# Patient Record
Sex: MALE | Race: OTHER | HISPANIC OR LATINO
[De-identification: names, ages, dates, MRNs, and addresses within clinical notes are randomized per-mention and may not be internally consistent; named-entity substitution may affect disease eponyms.]

---

## 2020-11-19 PROBLEM — Z00.00 ENCOUNTER FOR PREVENTIVE HEALTH EXAMINATION: Status: ACTIVE | Noted: 2020-11-19

## 2020-11-24 ENCOUNTER — APPOINTMENT (OUTPATIENT)
Dept: PULMONOLOGY | Facility: CLINIC | Age: 70
End: 2020-11-24
Payer: COMMERCIAL

## 2020-11-24 VITALS
WEIGHT: 195 LBS | DIASTOLIC BLOOD PRESSURE: 73 MMHG | SYSTOLIC BLOOD PRESSURE: 112 MMHG | HEART RATE: 59 BPM | OXYGEN SATURATION: 99 % | BODY MASS INDEX: 29.55 KG/M2 | HEIGHT: 68 IN | TEMPERATURE: 94.2 F

## 2020-11-24 DIAGNOSIS — Z87.09 PERSONAL HISTORY OF OTHER DISEASES OF THE RESPIRATORY SYSTEM: ICD-10-CM

## 2020-11-24 DIAGNOSIS — Z78.9 OTHER SPECIFIED HEALTH STATUS: ICD-10-CM

## 2020-11-24 DIAGNOSIS — Z87.891 PERSONAL HISTORY OF NICOTINE DEPENDENCE: ICD-10-CM

## 2020-11-24 PROCEDURE — 99204 OFFICE O/P NEW MOD 45 MIN: CPT

## 2020-11-24 RX ORDER — ASPIRIN 81 MG
81 TABLET, DELAYED RELEASE (ENTERIC COATED) ORAL
Refills: 0 | Status: ACTIVE | COMMUNITY

## 2020-11-24 NOTE — DISCUSSION/SUMMARY
[FreeTextEntry1] : 70 year old male, ex-smoker (quit smoking in 2019) with history of chronic allergic rhinitis with persistent cough (improved with use of Flonase nasal spray) came to clinic after CXR done in allergy specialist office showed emphysema. \par \par Oxygen saturation 99% on RA at rest\par \par A/P\par Patient has emphysema on CXR but clinically, patient denies history suggestive of symptoms secondary to COPD. Wheezing and chest congestion at night is due to post nasal drip and unlikely due to airway bronchospasm. Plan to get PFT with DLCO. We will request CXR. Follow up after PFT.

## 2020-11-24 NOTE — REVIEW OF SYSTEMS
[Fever] : no fever [Chills] : no chills [Dry Eyes] : no dry eyes [Eye Irritation] : no eye irritation [Nasal Congestion] : nasal congestion [Postnasal Drip] : postnasal drip [Cough] : cough [Hemoptysis] : no hemoptysis [Sputum] : no sputum [Dyspnea] : no dyspnea [SOB on Exertion] : no sob on exertion [Chest Discomfort] : no chest discomfort [Orthopnea] : no orthopnea [Hay Fever] : no hay fever [Watery Eyes] : no watery eyes [Nasal Discharge] : nasal discharge [Hives] : no hives [TextBox_148] : rest of ROS negative

## 2020-11-24 NOTE — PHYSICAL EXAM
[No Acute Distress] : no acute distress [Well Nourished] : well nourished [Normal Oropharynx] : normal oropharynx [II] : Mallampati Class: II [Normal Appearance] : normal appearance [No Neck Mass] : no neck mass [Normal Rate/Rhythm] : normal rate/rhythm [Normal S1, S2] : normal s1, s2 [No Resp Distress] : no resp distress [Clear to Auscultation Bilaterally] : clear to auscultation bilaterally [Benign] : benign [Not Tender] : not tender [Normal Gait] : normal gait [Deformity] : deformity [No Clubbing] : no clubbing [No Edema] : no edema [No Rash] : no rash [Normal Turgor] : normal turgor [No Focal Deficits] : no focal deficits [No Sensory Deficits] : no sensory deficits [Oriented x3] : oriented x3 [Normal Affect] : normal affect

## 2020-11-24 NOTE — HISTORY OF PRESENT ILLNESS
[TextBox_4] : 70 year old male, ex-smoker (quit smoking in 2019) with history of chronic allergic rhinitis with persistent cough (improved with use of Flonase nasal spray) came to clinic after CXR done in allergy specialist office showed emphysema. \par Patient denies SOB. Patient denies history of pulmonary illness or hospitalization due to pulmonary illness in past. Patient was c/o wheezing and chest congestion at night time. Hence, he was started on Breo inhaler.\par

## 2020-12-02 ENCOUNTER — APPOINTMENT (OUTPATIENT)
Dept: PULMONOLOGY | Facility: CLINIC | Age: 70
End: 2020-12-02

## 2020-12-03 ENCOUNTER — APPOINTMENT (OUTPATIENT)
Dept: PULMONOLOGY | Facility: CLINIC | Age: 70
End: 2020-12-03
Payer: COMMERCIAL

## 2020-12-03 VITALS
SYSTOLIC BLOOD PRESSURE: 156 MMHG | HEIGHT: 68 IN | TEMPERATURE: 98.2 F | OXYGEN SATURATION: 96 % | HEART RATE: 51 BPM | BODY MASS INDEX: 29.55 KG/M2 | DIASTOLIC BLOOD PRESSURE: 66 MMHG | WEIGHT: 195 LBS

## 2020-12-03 PROCEDURE — 99072 ADDL SUPL MATRL&STAF TM PHE: CPT

## 2020-12-03 PROCEDURE — 99214 OFFICE O/P EST MOD 30 MIN: CPT | Mod: 25

## 2020-12-03 PROCEDURE — 94729 DIFFUSING CAPACITY: CPT

## 2020-12-03 PROCEDURE — 94726 PLETHYSMOGRAPHY LUNG VOLUMES: CPT

## 2020-12-03 PROCEDURE — 94010 BREATHING CAPACITY TEST: CPT

## 2020-12-03 RX ORDER — CETIRIZINE HYDROCHLORIDE 10 MG/1
CAPSULE, LIQUID FILLED ORAL
Refills: 0 | Status: DISCONTINUED | COMMUNITY
End: 2020-12-03

## 2020-12-03 RX ORDER — FLUTICASONE FUROATE AND VILANTEROL TRIFENATATE 100; 25 UG/1; UG/1
100-25 POWDER RESPIRATORY (INHALATION)
Refills: 0 | Status: DISCONTINUED | COMMUNITY
End: 2020-12-03

## 2020-12-04 NOTE — HISTORY OF PRESENT ILLNESS
[TextBox_4] : 70 year old male, ex-smoker (quit smoking in 2019) with history of chronic allergic rhinitis with persistent cough (improved with use of Flonase nasal spray) came to clinic after CXR done in allergy specialist office showed emphysema. \par Patient denies SOB. Patient denies history of pulmonary illness or hospitalization due to pulmonary illness in past. Patient was c/o wheezing and chest congestion at night time. Hence, he was started on Breo inhaler.\par \par 12/3/20:\par Patient came back after PFT. Denies SOB, chest pain or cough. Patient is c/o excessive nasal congestion and headache.

## 2020-12-04 NOTE — PHYSICAL EXAM
[No Acute Distress] : no acute distress [Well Nourished] : well nourished [Normal Oropharynx] : normal oropharynx [Erythema] : erythema [Turbinate hypertrophy] : turbinate hypertrophy [II] : Mallampati Class: II [Normal Appearance] : normal appearance [No Neck Mass] : no neck mass [Normal Rate/Rhythm] : normal rate/rhythm [Normal S1, S2] : normal s1, s2 [No Resp Distress] : no resp distress [Clear to Auscultation Bilaterally] : clear to auscultation bilaterally [Benign] : benign [Not Tender] : not tender [Normal Gait] : normal gait [Deformity] : deformity [No Clubbing] : no clubbing [No Edema] : no edema [No Rash] : no rash [Normal Turgor] : normal turgor [No Focal Deficits] : no focal deficits [No Sensory Deficits] : no sensory deficits [Oriented x3] : oriented x3 [Normal Affect] : normal affect

## 2020-12-04 NOTE — DISCUSSION/SUMMARY
[FreeTextEntry1] : 70 year old male, ex-smoker (quit smoking in 2019) with history of chronic allergic rhinitis with persistent cough (improved with use of Flonase nasal spray) with emphysema on CXR.\par \par Oxygen saturation 96% on RA at rest\par \par Review:\par PFT (12/3/20): FEV1 107, FEV1/FVC 75, TLC 94, DLCO 107\par \par A/P\par Patient has emphysema on CXR but clinically, patient denies history suggestive of symptoms secondary to COPD. PFT showed normal spirometry with normal DLCO.No need for treatment of emphysema at present. Patient quit smoking 1 year back. No candidate for low dose CT chest. Patient was started on Flonase and montelukast for allergic rhinitis.

## 2020-12-22 ENCOUNTER — APPOINTMENT (OUTPATIENT)
Dept: PULMONOLOGY | Facility: CLINIC | Age: 70
End: 2020-12-22
Payer: COMMERCIAL

## 2020-12-22 DIAGNOSIS — K21.9 GASTRO-ESOPHAGEAL REFLUX DISEASE W/OUT ESOPHAGITIS: ICD-10-CM

## 2020-12-22 PROCEDURE — 99443: CPT

## 2020-12-22 RX ORDER — ALBUTEROL SULFATE 90 UG/1
108 (90 BASE) AEROSOL, METERED RESPIRATORY (INHALATION)
Qty: 1 | Refills: 5 | Status: ACTIVE | COMMUNITY
Start: 2020-12-22 | End: 1900-01-01

## 2020-12-23 NOTE — DISCUSSION/SUMMARY
[FreeTextEntry1] : 70 year old male, ex-smoker (quit smoking in 2019) with history of chronic allergic rhinitis with GERD with emphysema on CXR.\par \par Oxygen saturation 96% on RA at rest (last visit)\par \par Review:\par PFT (12/3/20): FEV1 107, FEV1/FVC 75, TLC 94, DLCO 107\par \par A/P\par Emphysema:\par Patient has emphysema on CXR but clinically, patient denies history suggestive of symptoms secondary to COPD. PFT showed normal spirometry with normal DLCO.No need for treatment of emphysema at present. Patient quit smoking 1 year back. No candidate for low dose CT chest. \par Patient was started on Flonase and montelukast for allergic rhinitis.\par \par Cough:\par - Conitnue Flonase and singualir\par - Add PPI\par \par Follow up in 6 weeks

## 2020-12-23 NOTE — REVIEW OF SYSTEMS
[Fever] : no fever [Chills] : no chills [Dry Eyes] : no dry eyes [Eye Irritation] : no eye irritation [Cough] : cough [Hemoptysis] : no hemoptysis [Sputum] : no sputum [Dyspnea] : no dyspnea [SOB on Exertion] : no sob on exertion [Chest Discomfort] : no chest discomfort [Orthopnea] : no orthopnea [Hay Fever] : no hay fever [Watery Eyes] : no watery eyes [Hives] : no hives [GERD] : gerd [TextBox_148] : rest of ROS negative

## 2020-12-23 NOTE — HISTORY OF PRESENT ILLNESS
[Home] : at home, [unfilled] , at the time of the visit. [Medical Office: ([facility])___] : at the medical office located in  [Verbal consent obtained from patient] : the patient, [unfilled] [TextBox_4] : 70 year old male, ex-smoker (quit smoking in 2019) with history of chronic allergic rhinitis with persistent cough (improved with use of Flonase nasal spray) came to clinic after CXR done in allergy specialist office showed emphysema. \par Patient denies SOB. Patient denies history of pulmonary illness or hospitalization due to pulmonary illness in past. Patient was c/o wheezing and chest congestion at night time. Hence, he was started on Breo inhaler.\par \par 12/3/20:\par Patient came back after PFT. Denies SOB, chest pain or cough. Patient is c/o excessive nasal congestion and headache. \par \par 12/23/20:\par Patient's nasal congestion and headache has resolved. Patient is c/o cough at night time with symptoms suggestive of GERD.

## 2021-01-15 ENCOUNTER — NON-APPOINTMENT (OUTPATIENT)
Age: 71
End: 2021-01-15

## 2021-01-15 RX ORDER — PANTOPRAZOLE 40 MG/1
40 TABLET, DELAYED RELEASE ORAL DAILY
Qty: 45 | Refills: 1 | Status: DISCONTINUED | COMMUNITY
Start: 2020-12-22 | End: 2021-01-15

## 2021-01-17 ENCOUNTER — TRANSCRIPTION ENCOUNTER (OUTPATIENT)
Age: 71
End: 2021-01-17

## 2021-01-18 ENCOUNTER — EMERGENCY (EMERGENCY)
Facility: HOSPITAL | Age: 71
LOS: 1 days | Discharge: HOME | End: 2021-01-18
Attending: EMERGENCY MEDICINE | Admitting: EMERGENCY MEDICINE
Payer: COMMERCIAL

## 2021-01-18 VITALS
HEART RATE: 64 BPM | RESPIRATION RATE: 16 BRPM | SYSTOLIC BLOOD PRESSURE: 120 MMHG | TEMPERATURE: 98 F | OXYGEN SATURATION: 97 % | DIASTOLIC BLOOD PRESSURE: 72 MMHG

## 2021-01-18 VITALS
RESPIRATION RATE: 15 BRPM | TEMPERATURE: 98 F | DIASTOLIC BLOOD PRESSURE: 83 MMHG | HEIGHT: 68 IN | WEIGHT: 195.11 LBS | OXYGEN SATURATION: 98 % | HEART RATE: 71 BPM | SYSTOLIC BLOOD PRESSURE: 141 MMHG
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
## 2021-01-18 DIAGNOSIS — Z23 ENCOUNTER FOR IMMUNIZATION: ICD-10-CM
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
## 2021-01-18 DIAGNOSIS — S01.81XA LACERATION WITHOUT FOREIGN BODY OF OTHER PART OF HEAD, INITIAL ENCOUNTER: ICD-10-CM

## 2021-01-18 PROCEDURE — 70486 CT MAXILLOFACIAL W/O DYE: CPT | Mod: 26

## 2021-01-18 PROCEDURE — 70450 CT HEAD/BRAIN W/O DYE: CPT | Mod: 26

## 2021-01-18 PROCEDURE — 99285 EMERGENCY DEPT VISIT HI MDM: CPT

## 2021-01-18 RX ORDER — TETANUS TOXOID, REDUCED DIPHTHERIA TOXOID AND ACELLULAR PERTUSSIS VACCINE, ADSORBED 5; 2.5; 8; 8; 2.5 [IU]/.5ML; [IU]/.5ML; UG/.5ML; UG/.5ML; UG/.5ML
0.5 SUSPENSION INTRAMUSCULAR ONCE
Refills: 0 | Status: COMPLETED | OUTPATIENT
Start: 2021-01-18 | End: 2021-01-18

## 2021-01-18 RX ADMIN — TETANUS TOXOID, REDUCED DIPHTHERIA TOXOID AND ACELLULAR PERTUSSIS VACCINE, ADSORBED 0.5 MILLILITER(S): 5; 2.5; 8; 8; 2.5 SUSPENSION INTRAMUSCULAR at 07:12

## 2021-01-18 NOTE — ED ADULT TRIAGE NOTE - CHIEF COMPLAINT QUOTE
Pt with complaint of a trip and fall while walking. Reports hitting his head. Noted to have laceration on the left eyebrow and swelling to nasal bridge.  Denies LOC. Takes baby aspirin daily. Unsure of last tetanus vaccine.

## 2021-01-18 NOTE — ED PROVIDER NOTE - PROGRESS NOTE DETAILS
Received from [facility] Foreman.  Patient without complaints at time of reassessment.  Seen and evaluated by Dr. Rodriguez.  Follow up in one week.  CT brain without evidence of acute injury.  Conservative management discussed with the patient in detail.  Close follow up encouraged.  Strict ED return instructions discussed in detail and patient given the opportunity to ask any questions about their discharge diagnosis and instructions

## 2021-01-18 NOTE — ED ADULT TRIAGE NOTE - WEIGHT IN LBS
HO from pt's son and transfer note.   bibems from [facility] [place] for [facility].  Pt mainly moams during painful stimulation. 195.1

## 2021-01-18 NOTE — ED PROVIDER NOTE - NSFOLLOWUPINSTRUCTIONS_ED_ALL_ED_FT
What is a concussion?  A concussion is a mild brain injury that can cause confusion, memory loss, and headache. Sometimes people pass out (lose consciousness) when they have a concussion, but not always.    A concussion can happen after a person has an injury to the head from being hit or falling.    What are the symptoms of a concussion?  Symptoms that can happen minutes to hours after a concussion include:    ?Memory loss – People sometimes forget what caused their injury, as well as what happened right before and after the injury.    ?Confusion    ?Headache    ?Dizziness or trouble with balance    ?Nausea or vomiting    ?Feeling sleepy    ?Acting cranky, strangely, or out of sorts    Symptoms that can happen hours to days after a concussion include:    ?Trouble walking or talking    ?Memory problems or problems paying attention    ?Trouble sleeping    ?Mood or behavior changes    ?Vision changes    ?Being bothered by noise or light    Will I need tests?  It depends on your injury and symptoms. To check if you have a concussion, your doctor will ask about your symptoms and do an exam. He or she will also ask you questions to check that you are thinking clearly.    If your doctor suspects a serious injury, he or she might order an imaging test of the brain, such as a CT or MRI scan. These tests create pictures of the skull and inside of the brain.    How is a concussion treated?  A concussion does not usually need treatment. Most concussions get better on their own, but it can take time. Some people's symptoms go away within minutes to hours. Other people have symptoms for weeks to months. When symptoms last a long time, doctors call it "postconcussion syndrome."    After your concussion, your doctor might recommend that someone watch you for 12 to 24 hours. This person should watch for symptoms.    To help your brain heal after a concussion, you can:    ?Rest your body – Make sure to get plenty of sleep. Avoid heavy exercise or too much physical activity if it makes you feel worse.    ?Rest your brain – Avoid doing activities that need concentration or a lot of attention if they make you feel worse.    ?Not drink alcohol while you are still having symptoms of concussion    ?Take a pain-relieving medicine, if you have a headache – You can choose one with acetaminophen (sample brand name: Tylenol) or ibuprofen (sample brand names: Advil, Motrin).    When can I play sports or do my usual activities again?  Ask your doctor when you can play sports or do your usual activities again. It will depend on your injury and symptoms, as well as the type of sport you play. Do not go back to playing on the same day as your injury.    It's important to let your brain heal completely after a concussion. Getting another concussion before your brain has healed may lead to serious brain problems.    When should I call the doctor or nurse?  Call the doctor or nurse if any of the following happen after a concussion:    ?You vomit more than 3 times    ?You have a severe headache, or a headache that gets worse    ?You have a seizure    ?You have trouble walking or talking    ?Your vision changes    ?You feel weak or numb in part of your body    ?You lose control over your bladder or bowel    If your doctor suggested that someone watch you after your concussion, this person should call the doctor or nurse if he or she:    ?Can't wake you up    ?Sees any of the symptoms listed above    How can I prevent another concussion?  To help prevent another concussion, you can:    ?Wear a helmet when you ride a bike or motorcycle, or play certain sports    ?Wear a seat belt when you drive or ride in a car    If you have one concussion, it's very important to try to prevent future concussions. Having many concussions might cause long-term brain damage and affect your thinking.    Contusion    A contusion is a deep bruise. Contusions are the result of a blunt injury to tissues and muscle fibers under the skin. The skin overlying the contusion may turn blue, purple, or yellow. Symptoms also include pain and swelling in the injured area.    SEEK IMMEDIATE MEDICAL CARE IF YOU HAVE ANY OF THE FOLLOWING SYMPTOMS: severe pain, numbness, tingling, pain, weakness, or skin color/temperature change in any part of your body distal to the injury.  Please follow up with your Primary MD in 24-48 hr for wound check or return here for wound check  Return in 7-10 days for suture removal  Keep wound dry and clean for 2 days then you can wash it gently with soap and water  Apply bacitracin topically after cleaning  Seek immediate medical care for any new/worsening signs or symptoms. Laceration    A laceration is a cut that goes through all of the layers of the skin and into the tissue that is right under the skin. Some lacerations heal on their own. Others need to be closed with skin adhesive strips, skin glue, stitches (sutures), or staples. Proper laceration care minimizes the risk of infection and helps the laceration to heal better.  If non-absorbable stitches or staples have been placed, they must be taken out within the time frame instructed by your healthcare provider.    SEEK IMMEDIATE MEDICAL CARE IF YOU HAVE ANY OF THE FOLLOWING SYMPTOMS: swelling around the wound, worsening pain, drainage from the wound, red streaking going away from your wound, inability to move finger or toe near the laceration, or discoloration of skin near the laceration.     What is a concussion?  A concussion is a mild brain injury that can cause confusion, memory loss, and headache. Sometimes people pass out (lose consciousness) when they have a concussion, but not always.    A concussion can happen after a person has an injury to the head from being hit or falling.    What are the symptoms of a concussion?  Symptoms that can happen minutes to hours after a concussion include:    ?Memory loss – People sometimes forget what caused their injury, as well as what happened right before and after the injury.    ?Confusion    ?Headache    ?Dizziness or trouble with balance    ?Nausea or vomiting    ?Feeling sleepy    ?Acting cranky, strangely, or out of sorts    Symptoms that can happen hours to days after a concussion include:    ?Trouble walking or talking    ?Memory problems or problems paying attention    ?Trouble sleeping    ?Mood or behavior changes    ?Vision changes    ?Being bothered by noise or light    Will I need tests?  It depends on your injury and symptoms. To check if you have a concussion, your doctor will ask about your symptoms and do an exam. He or she will also ask you questions to check that you are thinking clearly.    If your doctor suspects a serious injury, he or she might order an imaging test of the brain, such as a CT or MRI scan. These tests create pictures of the skull and inside of the brain.    How is a concussion treated?  A concussion does not usually need treatment. Most concussions get better on their own, but it can take time. Some people's symptoms go away within minutes to hours. Other people have symptoms for weeks to months. When symptoms last a long time, doctors call it "postconcussion syndrome."    After your concussion, your doctor might recommend that someone watch you for 12 to 24 hours. This person should watch for symptoms.    To help your brain heal after a concussion, you can:    ?Rest your body – Make sure to get plenty of sleep. Avoid heavy exercise or too much physical activity if it makes you feel worse.    ?Rest your brain – Avoid doing activities that need concentration or a lot of attention if they make you feel worse.    ?Not drink alcohol while you are still having symptoms of concussion    ?Take a pain-relieving medicine, if you have a headache – You can choose one with acetaminophen (sample brand name: Tylenol) or ibuprofen (sample brand names: Advil, Motrin).    When can I play sports or do my usual activities again?  Ask your doctor when you can play sports or do your usual activities again. It will depend on your injury and symptoms, as well as the type of sport you play. Do not go back to playing on the same day as your injury.    It's important to let your brain heal completely after a concussion. Getting another concussion before your brain has healed may lead to serious brain problems.    When should I call the doctor or nurse?  Call the doctor or nurse if any of the following happen after a concussion:    ?You vomit more than 3 times    ?You have a severe headache, or a headache that gets worse    ?You have a seizure    ?You have trouble walking or talking    ?Your vision changes    ?You feel weak or numb in part of your body    ?You lose control over your bladder or bowel    If your doctor suggested that someone watch you after your concussion, this person should call the doctor or nurse if he or she:    ?Can't wake you up    ?Sees any of the symptoms listed above    How can I prevent another concussion?  To help prevent another concussion, you can:    ?Wear a helmet when you ride a bike or motorcycle, or play certain sports    ?Wear a seat belt when you drive or ride in a car    If you have one concussion, it's very important to try to prevent future concussions. Having many concussions might cause long-term brain damage and affect your thinking.    Contusion    A contusion is a deep bruise. Contusions are the result of a blunt injury to tissues and muscle fibers under the skin. The skin overlying the contusion may turn blue, purple, or yellow. Symptoms also include pain and swelling in the injured area.    SEEK IMMEDIATE MEDICAL CARE IF YOU HAVE ANY OF THE FOLLOWING SYMPTOMS: severe pain, numbness, tingling, pain, weakness, or skin color/temperature change in any part of your body distal to the injury.  Please follow up with your Primary MD in 24-48 hr for wound check or return here for wound check  Return in 7-10 days for suture removal  Keep wound dry and clean for 2 days then you can wash it gently with soap and water  Apply bacitracin topically after cleaning  Seek immediate medical care for any new/worsening signs or symptoms.

## 2021-01-18 NOTE — ED PROVIDER NOTE - CLINICAL SUMMARY MEDICAL DECISION MAKING FREE TEXT BOX
71 y/o male here s/p mechanical fall with forehead lac and abrasions  tetanus/CT/ Plastics in morning

## 2021-01-18 NOTE — ED PROVIDER NOTE - OBJECTIVE STATEMENT
69 y/o male here denies pmhx now here c/o fall in the street while walking with laceration on forehead. Patient states it was a purely mechanical fall saying he tripped over the side walk. The patient appears well NAD stable. Denies chest pain,nausea,vomiting,diarrhea,fevers,chills,sob,loc. Patient ambulating without assistance. States he was wearing his glasses which is what he thinks caused his laceration

## 2021-01-18 NOTE — ED PROVIDER NOTE - CARE PLAN
Principal Discharge DX:	Fall, initial encounter   Principal Discharge DX:	Fall, initial encounter  Secondary Diagnosis:	Facial laceration, initial encounter

## 2021-01-28 ENCOUNTER — APPOINTMENT (OUTPATIENT)
Dept: PULMONOLOGY | Facility: CLINIC | Age: 71
End: 2021-01-28
Payer: COMMERCIAL

## 2021-01-28 DIAGNOSIS — J30.9 ALLERGIC RHINITIS, UNSPECIFIED: ICD-10-CM

## 2021-01-28 DIAGNOSIS — J43.9 EMPHYSEMA, UNSPECIFIED: ICD-10-CM

## 2021-01-28 PROBLEM — Z78.9 OTHER SPECIFIED HEALTH STATUS: Chronic | Status: ACTIVE | Noted: 2021-01-18

## 2021-01-28 PROCEDURE — 99443: CPT

## 2021-01-28 RX ORDER — AZELASTINE HYDROCHLORIDE 137 UG/1
137 SPRAY, METERED NASAL
Qty: 1 | Refills: 3 | Status: ACTIVE | COMMUNITY
Start: 2021-01-28 | End: 1900-01-01

## 2021-01-28 RX ORDER — FLUTICASONE PROPIONATE 50 UG/1
50 SPRAY, METERED NASAL
Refills: 0 | Status: DISCONTINUED | COMMUNITY
End: 2021-01-28

## 2021-01-29 PROBLEM — J43.9 EMPHYSEMA LUNG: Status: ACTIVE | Noted: 2020-11-24

## 2021-01-29 PROBLEM — J30.9 ALLERGIC RHINITIS: Status: ACTIVE | Noted: 2020-11-24

## 2021-01-29 RX ORDER — FLUTICASONE PROPIONATE 50 UG/1
50 SPRAY, METERED NASAL TWICE DAILY
Qty: 1 | Refills: 3 | Status: DISCONTINUED | COMMUNITY
Start: 2020-12-03 | End: 2021-01-29

## 2021-01-29 NOTE — DISCUSSION/SUMMARY
[FreeTextEntry1] : 70 year old male, ex-smoker (quit smoking in 2019) with history of chronic allergic rhinitis with GERD with emphysema on CXR.\par \par Oxygen saturation 96% on RA at rest (last visit)\par \par Review:\par PFT (12/3/20): FEV1 107, FEV1/FVC 75, TLC 94, DLCO 107\par \par A/P\par Emphysema:\par Patient has emphysema on CXR but clinically, patient denies history suggestive of symptoms secondary to COPD. PFT showed normal spirometry with normal DLCO.No need for treatment of emphysema at present. Patient quit smoking 1 year back. Not candidate for low dose CT chest. \par \par \par Cough:\par - Improved with use of nasal spray and Singulair\par - d/c Flonase and change to azelastine as patient has occasional nose bleed.\par \par Patient will be starting on blood thinner after eye surgery. Counselling about risk of nose bleeding done. Patient is aware to d/c blood thinner in case of excessive bleeding and go to ER.\par  \par \par Follow up in 6 weeks. \par

## 2021-01-29 NOTE — HISTORY OF PRESENT ILLNESS
[Home] : at home, [unfilled] , at the time of the visit. [Medical Office: ([facility])___] : at the medical office located in  [Verbal consent obtained from patient] : the patient, [unfilled] [TextBox_4] : 70 year old male, ex-smoker (quit smoking in 2019) with history of chronic allergic rhinitis with persistent cough (improved with use of Flonase nasal spray) came to clinic after CXR done in allergy specialist office showed emphysema. \par Patient denies SOB. Patient denies history of pulmonary illness or hospitalization due to pulmonary illness in past. Patient was c/o wheezing and chest congestion at night time. Hence, he was started on Breo inhaler.\par \par 12/3/20:\par Patient came back after PFT. Denies SOB, chest pain or cough. Patient is c/o excessive nasal congestion and headache. \par \par 12/23/20:\par Patient's nasal congestion and headache has resolved. Patient is c/o cough at night time with symptoms suggestive of GERD. \par \par 1/28/21:\par Patient wanted to talk to me because he will have eye surgery soon. He will be started on blood thinner. He was worried because he sometimes gets minimal nose bleed.

## 2021-01-29 NOTE — REVIEW OF SYSTEMS
[Dry Eyes] : no dry eyes [Eye Irritation] : no eye irritation [Cough] : cough [Sputum] : no sputum [Chest Discomfort] : no chest discomfort [Orthopnea] : no orthopnea [Hay Fever] : no hay fever [Watery Eyes] : no watery eyes [Hives] : no hives [GERD] : gerd [TextBox_148] : rest of ROS negative

## 2021-02-04 ENCOUNTER — APPOINTMENT (OUTPATIENT)
Dept: PULMONOLOGY | Facility: CLINIC | Age: 71
End: 2021-02-04

## 2021-02-23 ENCOUNTER — RX RENEWAL (OUTPATIENT)
Age: 71
End: 2021-02-23

## 2021-04-08 ENCOUNTER — RX RENEWAL (OUTPATIENT)
Age: 71
End: 2021-04-08

## 2021-04-08 RX ORDER — FAMOTIDINE 40 MG/1
40 TABLET, FILM COATED ORAL
Qty: 90 | Refills: 1 | Status: ACTIVE | COMMUNITY
Start: 2021-01-15 | End: 1900-01-01

## 2021-08-30 ENCOUNTER — RX RENEWAL (OUTPATIENT)
Age: 71
End: 2021-08-30

## 2022-03-03 ENCOUNTER — RX RENEWAL (OUTPATIENT)
Age: 72
End: 2022-03-03

## 2023-01-27 ENCOUNTER — RX RENEWAL (OUTPATIENT)
Age: 73
End: 2023-01-27

## 2023-04-07 NOTE — ED PROVIDER NOTE - PATIENT PORTAL LINK FT
cancer/diabetes/dementia/stroke You can access the FollowMyHealth Patient Portal offered by [facility] by registering at the following website: [email or web address]. By joining [employer] [facility]’s FollowMyHealth portal, you will also be able to view your health information using other applications (apps) compatible with our system.

## 2023-05-25 ENCOUNTER — RX RENEWAL (OUTPATIENT)
Age: 73
End: 2023-05-25

## 2023-10-17 ENCOUNTER — RX RENEWAL (OUTPATIENT)
Age: 73
End: 2023-10-17

## 2024-01-16 ENCOUNTER — RX RENEWAL (OUTPATIENT)
Age: 74
End: 2024-01-16

## 2024-01-16 RX ORDER — MONTELUKAST 10 MG/1
10 TABLET, FILM COATED ORAL
Qty: 30 | Refills: 0 | Status: ACTIVE | COMMUNITY
Start: 2020-12-03 | End: 1900-01-01